# Patient Record
Sex: MALE | Race: WHITE | Employment: FULL TIME | URBAN - METROPOLITAN AREA
[De-identification: names, ages, dates, MRNs, and addresses within clinical notes are randomized per-mention and may not be internally consistent; named-entity substitution may affect disease eponyms.]

---

## 2018-10-26 ENCOUNTER — APPOINTMENT (OUTPATIENT)
Dept: GENERAL RADIOLOGY | Age: 53
End: 2018-10-26
Attending: EMERGENCY MEDICINE
Payer: COMMERCIAL

## 2018-10-26 ENCOUNTER — HOSPITAL ENCOUNTER (OUTPATIENT)
Age: 53
Discharge: HOME | End: 2018-10-26
Attending: EMERGENCY MEDICINE
Payer: COMMERCIAL

## 2018-10-26 VITALS
DIASTOLIC BLOOD PRESSURE: 92 MMHG | SYSTOLIC BLOOD PRESSURE: 157 MMHG | BODY MASS INDEX: 32.51 KG/M2 | TEMPERATURE: 99 F | WEIGHT: 240 LBS | OXYGEN SATURATION: 100 % | HEIGHT: 72 IN | HEART RATE: 62 BPM | RESPIRATION RATE: 20 BRPM

## 2018-10-26 DIAGNOSIS — R05.3 PERSISTENT COUGH FOR 3 WEEKS OR LONGER: Primary | ICD-10-CM

## 2018-10-26 PROCEDURE — 99213 OFFICE O/P EST LOW 20 MIN: CPT

## 2018-10-26 PROCEDURE — 87798 DETECT AGENT NOS DNA AMP: CPT | Performed by: EMERGENCY MEDICINE

## 2018-10-26 PROCEDURE — 71046 X-RAY EXAM CHEST 2 VIEWS: CPT | Performed by: EMERGENCY MEDICINE

## 2018-10-26 PROCEDURE — 99214 OFFICE O/P EST MOD 30 MIN: CPT

## 2018-10-26 RX ORDER — AZITHROMYCIN 250 MG/1
TABLET, FILM COATED ORAL
Qty: 1 PACKAGE | Refills: 0 | Status: SHIPPED | OUTPATIENT
Start: 2018-10-26 | End: 2020-11-17

## 2018-10-26 NOTE — ED INITIAL ASSESSMENT (HPI)
C/o cough for 2 months, hot/cold sweats the past 2 days, no energy, intermittent chest pressure. Denies NVD. Denies SOB.

## 2018-10-26 NOTE — ED PROVIDER NOTES
Patient Seen in: [address]    History   Patient presents with:  Cough/URI    Stated Complaint: Chronic cough     HPI    The patient is a 47-year-old male with no significant past medical history presents now with a pers Soft, nontender and nondistended  Neurologic: Patient is awake, alert and oriented ×3.   The patient's motor strength is 5 out of 5 and symmetric in the upper and lower extremities bilaterally  Extremities: No focal swelling or tenderness  Skin: No pallor,

## 2020-11-17 ENCOUNTER — OFFICE VISIT (OUTPATIENT)
Dept: NEUROLOGY | Facility: CLINIC | Age: 55
End: 2020-11-17
Payer: COMMERCIAL

## 2020-11-17 ENCOUNTER — TELEPHONE (OUTPATIENT)
Dept: PHYSICAL THERAPY | Age: 55
End: 2020-11-17

## 2020-11-17 ENCOUNTER — TELEPHONE (OUTPATIENT)
Dept: PHYSICAL THERAPY | Facility: HOSPITAL | Age: 55
End: 2020-11-17

## 2020-11-17 VITALS — BODY MASS INDEX: 33.86 KG/M2 | WEIGHT: 250 LBS | HEIGHT: 72 IN

## 2020-11-17 DIAGNOSIS — G47.00 INSOMNIA, UNSPECIFIED TYPE: ICD-10-CM

## 2020-11-17 DIAGNOSIS — M51.26 LUMBAR DISC HERNIATION: Primary | ICD-10-CM

## 2020-11-17 DIAGNOSIS — M54.16 LUMBAR RADICULOPATHY: ICD-10-CM

## 2020-11-17 DIAGNOSIS — E66.3 PATIENT OVERWEIGHT: ICD-10-CM

## 2020-11-17 PROBLEM — I10 ESSENTIAL HYPERTENSION: Status: ACTIVE | Noted: 2018-11-06

## 2020-11-17 PROCEDURE — 99072 ADDL SUPL MATRL&STAF TM PHE: CPT | Performed by: PHYSICAL MEDICINE & REHABILITATION

## 2020-11-17 PROCEDURE — 3008F BODY MASS INDEX DOCD: CPT | Performed by: PHYSICAL MEDICINE & REHABILITATION

## 2020-11-17 PROCEDURE — 99244 OFF/OP CNSLTJ NEW/EST MOD 40: CPT | Performed by: PHYSICAL MEDICINE & REHABILITATION

## 2020-11-17 RX ORDER — CYCLOBENZAPRINE HCL 10 MG
10 TABLET ORAL NIGHTLY
Qty: 30 TABLET | Refills: 0 | Status: SHIPPED | OUTPATIENT
Start: 2020-11-17 | End: 2020-11-30

## 2020-11-17 RX ORDER — PREDNISONE 10 MG/1
TABLET ORAL
Qty: 28 TABLET | Refills: 0 | Status: SHIPPED | OUTPATIENT
Start: 2020-11-17 | End: 2020-11-30 | Stop reason: ALTCHOICE

## 2020-11-17 RX ORDER — GABAPENTIN 300 MG/1
300 CAPSULE ORAL NIGHTLY
COMMUNITY
Start: 2020-11-02 | End: 2020-12-15

## 2020-11-17 RX ORDER — MELOXICAM 15 MG/1
15 TABLET ORAL DAILY
Qty: 30 TABLET | Refills: 0 | Status: SHIPPED | OUTPATIENT
Start: 2020-11-17 | End: 2020-12-15

## 2020-11-17 NOTE — PROGRESS NOTES
[address]  Progress Note    CHIEF COMPLAINT:  Patient presents with:  Low Back Pain: New patient presents for low back pain.  Patient states that back pain started about 1 year ago c/o pain radiatin into rig Outpatient Medications   Medication Sig Dispense Refill   • gabapentin 300 MG Oral Cap Take 300 mg by mouth nightly.      • predniSONE 10 MG Oral Tab Take 7 tablets today,take 6 tablets tomorrow, then decrease number of tablets by one tablet each of the rem deformities  Heart: peripheral pulses intact. Normal capillary refill. Lungs: Non-labored respirations  Extremities: No lower extremity edema bilaterally   Skin: No lesions noted.    Spine: limited and painful lumbar extension  Hips: full and painfree ROM

## 2020-11-18 ENCOUNTER — OFFICE VISIT (OUTPATIENT)
Dept: PHYSICAL THERAPY | Age: 55
End: 2020-11-18
Attending: PHYSICAL MEDICINE & REHABILITATION
Payer: COMMERCIAL

## 2020-11-18 DIAGNOSIS — M51.26 LUMBAR DISC HERNIATION: ICD-10-CM

## 2020-11-18 DIAGNOSIS — M54.16 LUMBAR RADICULOPATHY: ICD-10-CM

## 2020-11-18 PROCEDURE — 97110 THERAPEUTIC EXERCISES: CPT

## 2020-11-18 PROCEDURE — 97162 PT EVAL MOD COMPLEX 30 MIN: CPT

## 2020-11-18 PROCEDURE — 97530 THERAPEUTIC ACTIVITIES: CPT

## 2020-11-18 NOTE — PROGRESS NOTES
SPINE EVALUATION:   Referring Physician: Dr. Sarita Cogan  Diagnosis:     Lumbar disc herniation (M51.26)  Lumbar radiculopathy (M54.16) Date of Service: 11/18/2020     PATIENT SUMMARY   Nicky Valiente \"Rob\" is a 60year old male who presents to therapy today w functional limitations include: difficulty with lying down which limits sleeps; bending; twisting; walking; standing on hard surfaces such standing to take a shower.      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 Skilled Physical Therapy is medically necessary to address the above impairments and reach functional goals.      Precautions:  None  OBJECTIVE:   Observation/Posture: Decreased lumbar lordosis    Neuro Screen: B LEs intact to light touch factors/comorbidities, 4+ body structures involved/activity limitations, and unstable symptoms including changing pain levels. PLAN OF CARE:    Goals: (to be met in 10 visits)   1.  Patient will be independent with HEP to optimize gains made in PT to home

## 2020-11-20 ENCOUNTER — OFFICE VISIT (OUTPATIENT)
Dept: PHYSICAL THERAPY | Age: 55
End: 2020-11-20
Attending: PHYSICAL MEDICINE & REHABILITATION
Payer: COMMERCIAL

## 2020-11-20 PROCEDURE — 97110 THERAPEUTIC EXERCISES: CPT

## 2020-11-20 PROCEDURE — 97140 MANUAL THERAPY 1/> REGIONS: CPT

## 2020-11-20 NOTE — PROGRESS NOTES
Dx: Lumbar disc herniation (M51.26)  Lumbar radiculopathy (M54.16)        Insurance (Authorized # of Visits):  BCBS PPO; 10 visits cert ends 8/02/0045           Authorizing Physician: Dr. Serrano Current  Next MD visit: 12/1/2020  Fall Risk: standard         Noman Dai tilts 10x2  Supine: Hip add ball 10x2  Supine: Hip abd tband 10x2       Manual Therapy  Sidelying MFR at lumbar x 10 min  Sidelying: lumbar articulation to improve flex & ext                     HEP: repeated flex in sitting; Supine pelvic tilt; sciatic n.

## 2020-11-20 NOTE — PATIENT INSTRUCTIONS
Home Exercise Program    SCIATIC NERVE GLIDE - SUPINE -  Repeat 20 Times, Hold 1 Second(s), Complete 2 Sets, Perform 2 Times a Day    LOWER TRUNK ROTATIONS - LTR - WIG WAGS -  Repeat 10 Times, Hold 1 Second(s), Complete 1 Set, Perform 2 Times a Day    Hip

## 2020-11-24 ENCOUNTER — OFFICE VISIT (OUTPATIENT)
Dept: PHYSICAL THERAPY | Age: 55
End: 2020-11-24
Attending: PHYSICAL MEDICINE & REHABILITATION
Payer: COMMERCIAL

## 2020-11-24 PROCEDURE — 97140 MANUAL THERAPY 1/> REGIONS: CPT

## 2020-11-24 PROCEDURE — 97110 THERAPEUTIC EXERCISES: CPT

## 2020-11-24 NOTE — PROGRESS NOTES
Dx: Lumbar disc herniation (M51.26)  Lumbar radiculopathy (M54.16)        Insurance (Authorized # of Visits):  BCBS PPO; 10 visits cert ends 2/52/6244           Authorizing Physician: Dr. Sameera Corral  Next MD visit: 12/1/2020  Fall Risk: standard         Meera Bruner will discuss additional options for pain management such as an injection if appropriate.  He will continue to benefit from PT to work on ROM, strength and functional mobility training to meet his goals to meet occupational demands and return to usual ADLs a 11/24/2020              TX#: 3/10 Date:                 TX#: 4/ Date:                 TX#: 5/ Date:    Tx#: 6/   Therapeutic Exercises  Supine: Sciatic n. neuromob 20 ankle pumps x 2  Supine LTR 10x2  Supine Pelvic tilts 10x2  Supine: Hip add ball 10x2  Sup

## 2020-11-30 ENCOUNTER — PATIENT MESSAGE (OUTPATIENT)
Dept: NEUROLOGY | Facility: CLINIC | Age: 55
End: 2020-11-30

## 2020-11-30 ENCOUNTER — MED REC SCAN ONLY (OUTPATIENT)
Dept: NEUROLOGY | Facility: CLINIC | Age: 55
End: 2020-11-30

## 2020-11-30 ENCOUNTER — OFFICE VISIT (OUTPATIENT)
Dept: NEUROLOGY | Facility: CLINIC | Age: 55
End: 2020-11-30
Payer: COMMERCIAL

## 2020-11-30 ENCOUNTER — TELEPHONE (OUTPATIENT)
Dept: NEUROLOGY | Facility: CLINIC | Age: 55
End: 2020-11-30

## 2020-11-30 VITALS — WEIGHT: 250 LBS | HEIGHT: 72 IN | BODY MASS INDEX: 33.86 KG/M2

## 2020-11-30 DIAGNOSIS — M54.16 LUMBAR RADICULOPATHY: Primary | ICD-10-CM

## 2020-11-30 PROBLEM — R73.9 HYPERGLYCEMIA: Status: ACTIVE | Noted: 2017-12-19

## 2020-11-30 PROBLEM — G47.33 OBSTRUCTIVE SLEEP APNEA SYNDROME: Status: ACTIVE | Noted: 2017-12-19

## 2020-11-30 PROCEDURE — 3008F BODY MASS INDEX DOCD: CPT | Performed by: PHYSICAL MEDICINE & REHABILITATION

## 2020-11-30 PROCEDURE — 99214 OFFICE O/P EST MOD 30 MIN: CPT | Performed by: PHYSICAL MEDICINE & REHABILITATION

## 2020-11-30 RX ORDER — HYDROCODONE BITARTRATE AND ACETAMINOPHEN 7.5; 325 MG/1; MG/1
1 TABLET ORAL EVERY 6 HOURS PRN
Qty: 56 TABLET | Refills: 0 | Status: SHIPPED | OUTPATIENT
Start: 2020-11-30 | End: 2020-12-14

## 2020-11-30 RX ORDER — CYCLOBENZAPRINE HCL 10 MG
20 TABLET ORAL NIGHTLY
Qty: 60 TABLET | Refills: 0 | Status: SHIPPED | OUTPATIENT
Start: 2020-11-30 | End: 2020-12-15

## 2020-11-30 NOTE — TELEPHONE ENCOUNTER
Patient has been scheduled for a Left L5 (L5-S1) TFESI on 12/10/20 at the [address] [place] [address]. Medications and allergies reviewed.  Patient informed we will need verbal or written authorization from patients Primary Care Physician/Cardiologist to hold blood thinners 3-5 d

## 2020-11-30 NOTE — PROGRESS NOTES
[address]  Progress Note    CHIEF COMPLAINT:  Patient presents with:  Low Back Pain: Patient presents to follow up on low back pain. He is currently in physical therapy which is helping a little.  Taking fle Medications   Medication Sig Dispense Refill   • HYDROcodone-acetaminophen 7.5-325 MG Oral Tab Take 1 tablet by mouth every 6 (six) hours as needed for Pain. 56 tablet 0   • cyclobenzaprine 10 MG Oral Tab Take 2 tablets (20 mg total) by mouth nightly.  60 t body of L5. Other levels are unremarkable in comparison. 2.  I reviewed an MRI lumbar spine from October 2019 showing a similar disc herniation with a smaller extruded fragment. ASSESSMENT AND PLAN:  1.  Lumbar radiculopathy  He still has severe rad

## 2020-11-30 NOTE — TELEPHONE ENCOUNTER
Left L5 (L5-S1) TFESI CPT CODE: 49291,29019 -APPROVED  Per [employer] online no authorization is required.    Transaction ID: [id number] Transaction Date: Nov 30 12:20 pm Customer ID: [id number]  Will inform BAILEY Approved referrals

## 2020-11-30 NOTE — TELEPHONE ENCOUNTER
From: Cait Soria  To: Lang Barron MD  Sent: 11/30/2020 2:38 PM CST  Subject: Visit Follow-up Question    Dr Mary Sloan,  I got called from scheduling and they said nothing until 12/10. Not sure if I can wait that long with the pain.  Are there other opti

## 2020-11-30 NOTE — TELEPHONE ENCOUNTER
How about Wednesday or Friday AM with Janay in radiology at the [facility]? We can sedate him with Valium and benadryl again.

## 2020-12-01 NOTE — TELEPHONE ENCOUNTER
Confirmed with patient that he will be available and have a  to appt on Friday at 0730am.    Left message with Maty sAhby in central scheduling. Waiting on her return call to confirm.

## 2020-12-02 ENCOUNTER — OFFICE VISIT (OUTPATIENT)
Dept: PHYSICAL THERAPY | Age: 55
End: 2020-12-02
Attending: PHYSICAL MEDICINE & REHABILITATION
Payer: COMMERCIAL

## 2020-12-02 PROCEDURE — 97110 THERAPEUTIC EXERCISES: CPT

## 2020-12-02 RX ORDER — DIAZEPAM 10 MG/1
TABLET ORAL
Qty: 2 TABLET | Refills: 0 | Status: SHIPPED | OUTPATIENT
Start: 2020-12-02 | End: 2020-12-15 | Stop reason: ALTCHOICE

## 2020-12-02 NOTE — PATIENT INSTRUCTIONS
Home Exercise Program    HIP EXTENSION - STANDING -  Repeat 10 Times, Hold 1 Second(s), Complete 2 Sets, Perform 2 Times a Day    HIP ABDUCTION - SIDELYING -  Repeat 10 Times, Hold 1 Second(s), Complete 2 Sets, Perform 2 Times a Day    SIDELYING CLAMSHELL

## 2020-12-02 NOTE — PROGRESS NOTES
Dx: Lumbar disc herniation (M51.26)  Lumbar radiculopathy (M54.16)        Insurance (Authorized # of Visits):  BCBS PPO; 10 visits cert ends 2/38/3006           Authorizing Physician: Dr. Pollo Klein MD visit: 12/1/2020  Fall Risk: standard         Mariela Acevedo to call Dr. Sameera Corral if edema persists. Goals: (to be met in 10 visits)   1. Patient will be independent with HEP to optimize gains made in PT to home and community settings and for self management of prophylactic care.   2. Patient will have trunk ROM [facility] pelvic tilt; sciatic n.  Neuromob; LTR; hip add ball; hip abd tband; sidelying clams; slidelying hip abd; standing hip ext    Charges: 3 TE      Total Timed Treatment: 50 min  Total Treatment Time: 50 min

## 2020-12-02 NOTE — TELEPHONE ENCOUNTER
LMTCB - to confirm injection appt with patient. 12/04/20 [facility] arrival time 0715am. Remind patient to do online check in through [address]

## 2020-12-03 RX ORDER — BETAMETHASONE SODIUM PHOSPHATE AND BETAMETHASONE ACETATE 3; 3 MG/ML; MG/ML
12 INJECTION, SUSPENSION INTRA-ARTICULAR; INTRALESIONAL; INTRAMUSCULAR; SOFT TISSUE ONCE
Status: COMPLETED | OUTPATIENT
Start: 2020-12-04 | End: 2020-12-04

## 2020-12-04 ENCOUNTER — HOSPITAL ENCOUNTER (OUTPATIENT)
Dept: GENERAL RADIOLOGY | Facility: HOSPITAL | Age: 55
Discharge: HOME | End: 2020-12-04
Attending: PHYSICAL MEDICINE & REHABILITATION
Payer: COMMERCIAL

## 2020-12-04 ENCOUNTER — OFFICE VISIT (OUTPATIENT)
Dept: NEUROLOGY | Facility: CLINIC | Age: 55
End: 2020-12-04
Payer: COMMERCIAL

## 2020-12-04 DIAGNOSIS — M54.16 LUMBAR RADICULOPATHY: Primary | ICD-10-CM

## 2020-12-04 DIAGNOSIS — M54.16 LUMBAR RADICULOPATHY: ICD-10-CM

## 2020-12-04 PROCEDURE — 77002 NEEDLE LOCALIZATION BY XRAY: CPT | Performed by: PHYSICAL MEDICINE & REHABILITATION

## 2020-12-04 PROCEDURE — 64483 NJX AA&/STRD TFRM EPI L/S 1: CPT | Performed by: PHYSICAL MEDICINE & REHABILITATION

## 2020-12-04 PROCEDURE — 64483 NJX AA&/STRD TFRM EPI L/S 1: CPT

## 2020-12-04 PROCEDURE — 77002 NEEDLE LOCALIZATION BY XRAY: CPT

## 2020-12-04 RX ORDER — LIDOCAINE HYDROCHLORIDE 10 MG/ML
15 INJECTION, SOLUTION EPIDURAL; INFILTRATION; INTRACAUDAL; PERINEURAL ONCE
Status: DISCONTINUED | OUTPATIENT
Start: 2020-12-04 | End: 2020-12-06

## 2020-12-04 RX ORDER — DIPHENHYDRAMINE HYDROCHLORIDE 50 MG/ML
INJECTION INTRAMUSCULAR; INTRAVENOUS
Status: COMPLETED
Start: 2020-12-04 | End: 2020-12-04

## 2020-12-04 RX ORDER — LIDOCAINE HYDROCHLORIDE 10 MG/ML
INJECTION, SOLUTION EPIDURAL; INFILTRATION; INTRACAUDAL; PERINEURAL
Status: COMPLETED
Start: 2020-12-04 | End: 2020-12-04

## 2020-12-04 RX ORDER — DIPHENHYDRAMINE HYDROCHLORIDE 50 MG/ML
50 INJECTION INTRAMUSCULAR; INTRAVENOUS EVERY 4 HOURS PRN
Status: DISCONTINUED | OUTPATIENT
Start: 2020-12-04 | End: 2020-12-06

## 2020-12-04 RX ORDER — LIDOCAINE HYDROCHLORIDE 10 MG/ML
1 INJECTION, SOLUTION EPIDURAL; INFILTRATION; INTRACAUDAL; PERINEURAL ONCE
Status: COMPLETED | OUTPATIENT
Start: 2020-12-04 | End: 2020-12-04

## 2020-12-04 RX ORDER — DIPHENHYDRAMINE HYDROCHLORIDE 50 MG/ML
25 INJECTION INTRAMUSCULAR; INTRAVENOUS ONCE
Status: COMPLETED | OUTPATIENT
Start: 2020-12-04 | End: 2020-12-04

## 2020-12-04 RX ADMIN — BETAMETHASONE SODIUM PHOSPHATE AND BETAMETHASONE ACETATE 12 MG: 3; 3 INJECTION, SUSPENSION INTRA-ARTICULAR; INTRALESIONAL; INTRAMUSCULAR; SOFT TISSUE at 08:00:00

## 2020-12-04 RX ADMIN — DIPHENHYDRAMINE HYDROCHLORIDE 50 MG: 50 INJECTION INTRAMUSCULAR; INTRAVENOUS at 08:00:00

## 2020-12-04 RX ADMIN — LIDOCAINE HYDROCHLORIDE 15 ML: 10 INJECTION, SOLUTION EPIDURAL; INFILTRATION; INTRACAUDAL; PERINEURAL at 08:00:00

## 2020-12-04 NOTE — PROCEDURES
Preoperative Diagnosis:  (M54.16) Lumbar radiculopathy  (primary encounter diagnosis)       Postoperative Diagnosis:  (M54.16) Lumbar radiculopathy  (primary encounter diagnosis)       Procedures: Left L5 Transforaminal epidural steroid injection under flu

## 2020-12-04 NOTE — IMAGING NOTE
Received patient in rad holding post epidural injection done by Dr Soto Torres @ 0830. Review discharge criteria with Dr Soto Torres. Mediations charted. Wife with patient during recovery. No acute discomfort noted. 0830 VS /65 HR 62 PO2 sat 96%.     Juice pr

## 2020-12-07 ENCOUNTER — OFFICE VISIT (OUTPATIENT)
Dept: PHYSICAL THERAPY | Age: 55
End: 2020-12-07
Attending: PHYSICAL MEDICINE & REHABILITATION
Payer: COMMERCIAL

## 2020-12-07 PROCEDURE — 97110 THERAPEUTIC EXERCISES: CPT

## 2020-12-07 PROCEDURE — 97140 MANUAL THERAPY 1/> REGIONS: CPT

## 2020-12-07 PROCEDURE — 97112 NEUROMUSCULAR REEDUCATION: CPT

## 2020-12-07 NOTE — PROGRESS NOTES
Dx: Lumbar disc herniation (M51.26)  Lumbar radiculopathy (M54.16)        Insurance (Authorized # of Visits):  BCBS PPO; 10 visits cert ends 9/79/6633           Authorizing Physician: Dr. Edwina Klein MD visit: 12/1/2020  Fall Risk: standard         Aguila Holder painfree. Pt advised to contact Dr. Soto Torres on Thur/Fri for f/u following injection. Goals: (to be met in 10 visits)   1.  Patient will be independent with HEP to optimize gains made in PT to home and community settings and for self management of prophy manip  MET for ant rot at R innominate with \"shotgun\"   Prone: MFR at L lumbar/L hamstrings Manual Therapy  PROM at B hips x 5 min Manual Therapy  Sidelying MFR at B lumbar paraspinals/L hamstrings x 8 min  Sidelying: MFR L sciatic nerve x 3 min      Nikita

## 2020-12-09 ENCOUNTER — OFFICE VISIT (OUTPATIENT)
Dept: PHYSICAL THERAPY | Age: 55
End: 2020-12-09
Attending: PHYSICAL MEDICINE & REHABILITATION
Payer: COMMERCIAL

## 2020-12-09 ENCOUNTER — MED REC SCAN ONLY (OUTPATIENT)
Dept: NEUROLOGY | Facility: CLINIC | Age: 55
End: 2020-12-09

## 2020-12-09 ENCOUNTER — PATIENT MESSAGE (OUTPATIENT)
Dept: NEUROLOGY | Facility: CLINIC | Age: 55
End: 2020-12-09

## 2020-12-09 DIAGNOSIS — M51.26 LUMBAR DISC HERNIATION: Primary | ICD-10-CM

## 2020-12-09 PROCEDURE — 97112 NEUROMUSCULAR REEDUCATION: CPT

## 2020-12-09 PROCEDURE — 97110 THERAPEUTIC EXERCISES: CPT

## 2020-12-09 PROCEDURE — 97140 MANUAL THERAPY 1/> REGIONS: CPT

## 2020-12-09 NOTE — TELEPHONE ENCOUNTER
From: Evangelist Jimenes  To: Julian Pabon MD  Sent: 12/9/2020 2:39 PM CST  Subject: Non-Urgent Medical Question    [facility],  I need it to be understood that if surgery is an option , that I want it before year end. Secondly, I need a note from the doctor.

## 2020-12-09 NOTE — PROGRESS NOTES
Dx: Lumbar disc herniation (M51.26)  Lumbar radiculopathy (M54.16)        Insurance (Authorized # of Visits):  BCBS PPO; 10 visits cert ends 6/33/6827           Authorizing Physician: Dr. Malachi Klein MD visit: 12/1/2020  Fall Risk: standard         Rafa Garcia 4/5            Hip ER: R 4/5; L 5/5  Hip IR: R 5/5; L 5/5  Knee Flexion: R 5/5; L 5/5            Knee extension (L3): R 4 -/5*; L 5/5            DF (L4): R 5/5; L 5/5  Great Toe Ext (L5): R 5/5, L 5/5  PF (S1): R 4/5; L 5/5 Hip flexion (L2): R 4/5; L 5/5 able to sleep for at least 6 consecutive hours without disturbance due to back/LE pain. 5. Patient will be able to perform ADLs such as standing to shower without provocation of back pain. 12/9/2020: Reviewed goals with pt. Goals are in progress. 20x  Supine Pelvic tilt x5 (stopped due to pain)  Sidelying: glut sets 20x  Sidelying: hip clams 10x2  Sidelying: hip SLR 10x2  Sitting piriformis stretch 20\"x3  Standing hip ext 10x2   Therapeutic Exercise  Sidelying: caridad 10x2  Sidelying: Hip add

## 2020-12-09 NOTE — TELEPHONE ENCOUNTER
It can take 7 days to have the max effect from the injection. Continue with Lesa. Needs a 2 week follow up appt with me around 12/18/20 please. Will need to allow injection to fully take effect before reassessing. Continue all other meds.

## 2020-12-09 NOTE — TELEPHONE ENCOUNTER
From: Brianna Metzger  To: Preston Richards MD  Sent: 12/9/2020 10:35 AM CST  Subject: Visit Follow-up Question    Dr Jennifer Hummel,  I just got back from PT with Maria Luisa Sanders. She said that she would be reaching out to you today.   My situation seems like I am back to squ

## 2020-12-10 NOTE — TELEPHONE ENCOUNTER
Ok, pls send to Dr. Lolita Mercado. Have him drop off any forms for us to sign for FMLA. Will contact Sheryl. Guadalupe Riedel

## 2020-12-11 ENCOUNTER — TELEPHONE (OUTPATIENT)
Dept: NEUROLOGY | Facility: CLINIC | Age: 55
End: 2020-12-11

## 2020-12-15 ENCOUNTER — HOSPITAL ENCOUNTER (OUTPATIENT)
Dept: GENERAL RADIOLOGY | Facility: HOSPITAL | Age: 55
Discharge: HOME | End: 2020-12-15
Attending: NEUROLOGICAL SURGERY
Payer: COMMERCIAL

## 2020-12-15 ENCOUNTER — OFFICE VISIT (OUTPATIENT)
Dept: PHYSICAL THERAPY | Age: 55
End: 2020-12-15
Attending: PHYSICAL MEDICINE & REHABILITATION
Payer: COMMERCIAL

## 2020-12-15 ENCOUNTER — MED REC SCAN ONLY (OUTPATIENT)
Dept: NEUROLOGY | Facility: CLINIC | Age: 55
End: 2020-12-15

## 2020-12-15 ENCOUNTER — OFFICE VISIT (OUTPATIENT)
Dept: NEUROLOGY | Facility: CLINIC | Age: 55
End: 2020-12-15
Payer: COMMERCIAL

## 2020-12-15 VITALS — BODY MASS INDEX: 33.86 KG/M2 | HEIGHT: 72 IN | WEIGHT: 250 LBS

## 2020-12-15 DIAGNOSIS — M48.062 SPINAL STENOSIS, LUMBAR REGION WITH NEUROGENIC CLAUDICATION: ICD-10-CM

## 2020-12-15 DIAGNOSIS — Z01.811 ENCOUNTER FOR PREOPERATIVE PULMONARY EXAMINATION: ICD-10-CM

## 2020-12-15 DIAGNOSIS — M51.26 DISPLACEMENT OF LUMBAR INTERVERTEBRAL DISC WITHOUT MYELOPATHY: ICD-10-CM

## 2020-12-15 DIAGNOSIS — M51.26 LUMBAR DISC HERNIATION: Primary | ICD-10-CM

## 2020-12-15 DIAGNOSIS — M47.26 OTHER SPONDYLOSIS WITH RADICULOPATHY, LUMBAR REGION: ICD-10-CM

## 2020-12-15 DIAGNOSIS — M54.16 LUMBAR RADICULOPATHY: ICD-10-CM

## 2020-12-15 PROCEDURE — 71046 X-RAY EXAM CHEST 2 VIEWS: CPT | Performed by: NEUROLOGICAL SURGERY

## 2020-12-15 PROCEDURE — 3008F BODY MASS INDEX DOCD: CPT | Performed by: PHYSICAL MEDICINE & REHABILITATION

## 2020-12-15 PROCEDURE — 99213 OFFICE O/P EST LOW 20 MIN: CPT | Performed by: PHYSICAL MEDICINE & REHABILITATION

## 2020-12-15 PROCEDURE — 97110 THERAPEUTIC EXERCISES: CPT

## 2020-12-15 PROCEDURE — 97112 NEUROMUSCULAR REEDUCATION: CPT

## 2020-12-15 PROCEDURE — 72040 X-RAY EXAM NECK SPINE 2-3 VW: CPT | Performed by: NEUROLOGICAL SURGERY

## 2020-12-15 RX ORDER — HYDROCODONE BITARTRATE AND ACETAMINOPHEN 7.5; 325 MG/1; MG/1
1 TABLET ORAL EVERY 6 HOURS PRN
Qty: 60 TABLET | Refills: 0 | Status: SHIPPED | OUTPATIENT
Start: 2020-12-15

## 2020-12-15 RX ORDER — HYDROCODONE BITARTRATE AND ACETAMINOPHEN 7.5; 325 MG/1; MG/1
TABLET ORAL
COMMUNITY
Start: 2020-11-30 | End: 2020-12-15

## 2020-12-15 RX ORDER — CYCLOBENZAPRINE HCL 10 MG
20 TABLET ORAL NIGHTLY
Qty: 60 TABLET | Refills: 0 | Status: SHIPPED | OUTPATIENT
Start: 2020-12-15 | End: 2021-01-14

## 2020-12-15 NOTE — PROGRESS NOTES
Dx: Lumbar disc herniation (M51.26)  Lumbar radiculopathy (M54.16)        Insurance (Authorized # of Visits):  BCBS PPO; 10 visits cert ends 4/16/1804           Authorizing Physician: Dr. Luiza Klein MD visit: 12/1/2020  Fall Risk: standard         Recardo Bouquet 5/5  Hip IR: R 5/5; L 5/5  Knee Flexion: R 5/5; L 5/5            Knee extension (L3): R 5/5; L 5/5 (leg shaking)            DF (L4): R 5/5; L 5/5 (leg shaking)  Great Toe Ext (L5): R 5/5, L 5/5  PF (S1): R 5/5; L 5/5 (leg shaking)  * able to perform single 10x2  Supine: Hip abd tband 10x2 Therapeutic Exercises  Supine Hip add ball 20x  Supine Hip abd tband 20x  Supine Pelvic tilt x5 (stopped due to pain)  Sidelying: glut sets 20x  Sidelying: hip clams 10x2  Sidelying: hip SLR 10x2  Sitting piriformis stretch trial with SC for unloading at L LE  Weight shifting M/L in standing: 10x1 Neuro Re-ed  Supine: bridging spinal articulation 10x2  Gait training: emphasis on gluteal activation x 3 min  Weight shifting M/L in standing: 10x1           HEP: repeated flex in

## 2020-12-15 NOTE — PROGRESS NOTES
[address]  Progress Note    CHIEF COMPLAINT:  Patient presents with:  Low Back Pain: Patient presents for post Left L5 transforaminal Epidural injection .  Patient states that his symptoms remain the same an tablets (20 mg total) by mouth nightly.  60 tablet 0       ALLERGIES:     Levaquin [Levofloxa*      Penicillins               Quinolones                  REVIEW OF SYSTEMS:   Patient-reported ROS  Constitutional  Sleep Disturbance: admits   Cardiovascular intervention. He will be seeing Dr. Mushtaq Troncoso in the afternoon. I believe that is a good move. I gave him some Norco and Flexeril to hold him through the perioperative period. He will return as needed.     2. Lumbar radiculopathy  The epidural injection help

## 2020-12-17 ENCOUNTER — HOSPITAL ENCOUNTER (OUTPATIENT)
Dept: GENERAL RADIOLOGY | Facility: HOSPITAL | Age: 55
Discharge: HOME | End: 2020-12-17
Attending: NEUROLOGICAL SURGERY
Payer: COMMERCIAL

## 2020-12-17 ENCOUNTER — TELEPHONE (OUTPATIENT)
Dept: PHYSICAL THERAPY | Facility: HOSPITAL | Age: 55
End: 2020-12-17

## 2020-12-17 DIAGNOSIS — M51.26 OTHER INTERVERTEBRAL DISC DISPLACEMENT, LUMBAR REGION: ICD-10-CM

## 2020-12-17 DIAGNOSIS — M48.062 SPINAL STENOSIS, LUMBAR REGION WITH NEUROGENIC CLAUDICATION: ICD-10-CM

## 2020-12-17 DIAGNOSIS — M47.26 OTHER SPONDYLOSIS WITH RADICULOPATHY, LUMBAR REGION: ICD-10-CM

## 2020-12-17 PROCEDURE — 72110 X-RAY EXAM L-2 SPINE 4/>VWS: CPT | Performed by: NEUROLOGICAL SURGERY

## 2020-12-18 ENCOUNTER — HOSPITAL ENCOUNTER (OUTPATIENT)
Dept: GENERAL RADIOLOGY | Facility: HOSPITAL | Age: 55
Discharge: HOME | End: 2020-12-18
Attending: INTERNAL MEDICINE
Payer: COMMERCIAL

## 2020-12-18 ENCOUNTER — APPOINTMENT (OUTPATIENT)
Dept: PHYSICAL THERAPY | Age: 55
End: 2020-12-18
Attending: PHYSICAL MEDICINE & REHABILITATION
Payer: COMMERCIAL

## 2020-12-18 DIAGNOSIS — Z01.818 PRE-OP EVALUATION: ICD-10-CM

## 2020-12-18 PROCEDURE — 71046 X-RAY EXAM CHEST 2 VIEWS: CPT | Performed by: INTERNAL MEDICINE

## 2020-12-21 ENCOUNTER — APPOINTMENT (OUTPATIENT)
Dept: PHYSICAL THERAPY | Age: 55
End: 2020-12-21
Attending: PHYSICAL MEDICINE & REHABILITATION
Payer: COMMERCIAL

## 2020-12-23 ENCOUNTER — APPOINTMENT (OUTPATIENT)
Dept: PHYSICAL THERAPY | Age: 55
End: 2020-12-23
Attending: PHYSICAL MEDICINE & REHABILITATION
Payer: COMMERCIAL

## 2020-12-28 ENCOUNTER — LAB REQUISITION (OUTPATIENT)
Dept: LAB | Facility: HOSPITAL | Age: 55
End: 2020-12-28
Payer: COMMERCIAL

## 2020-12-28 DIAGNOSIS — Z01.818 ENCOUNTER FOR OTHER PREPROCEDURAL EXAMINATION: ICD-10-CM

## 2020-12-28 LAB — SARS-COV-2 RNA RESP QL NAA+PROBE: NOT DETECTED

## 2021-01-07 ENCOUNTER — TELEPHONE (OUTPATIENT)
Dept: NEUROLOGY | Facility: CLINIC | Age: 56
End: 2021-01-07

## 2021-01-07 NOTE — TELEPHONE ENCOUNTER
Called patient, who states pt received an email from Macie Louis requesting  additional information for claim.  Provided patient with billing's phone number ([phone])

## 2021-01-07 NOTE — TELEPHONE ENCOUNTER
Pt called to check on status of approval for LUMBAR TRANSFORAMINAL EPIDURAL INJECTION. He was told by the [employer] company that they requested more information from is and we have not responded. Please contact patient with update.

## 2021-01-19 ENCOUNTER — ORDER TRANSCRIPTION (OUTPATIENT)
Dept: PHYSICAL THERAPY | Facility: HOSPITAL | Age: 56
End: 2021-01-19

## 2021-01-19 DIAGNOSIS — M47.26 OTHER SPONDYLOSIS WITH RADICULOPATHY, LUMBAR REGION: Primary | ICD-10-CM

## 2021-01-25 ENCOUNTER — OFFICE VISIT (OUTPATIENT)
Dept: PHYSICAL THERAPY | Age: 56
End: 2021-01-25
Attending: NEUROLOGICAL SURGERY
Payer: COMMERCIAL

## 2021-01-25 DIAGNOSIS — M47.26 OTHER SPONDYLOSIS WITH RADICULOPATHY, LUMBAR REGION: ICD-10-CM

## 2021-01-25 PROCEDURE — 97161 PT EVAL LOW COMPLEX 20 MIN: CPT | Performed by: PHYSICAL THERAPIST

## 2021-01-25 PROCEDURE — 97530 THERAPEUTIC ACTIVITIES: CPT | Performed by: PHYSICAL THERAPIST

## 2021-01-25 PROCEDURE — 97110 THERAPEUTIC EXERCISES: CPT | Performed by: PHYSICAL THERAPIST

## 2021-01-25 NOTE — PROGRESS NOTES
SPINE EVALUATION:   Referring Physician: Dr. Devin Araya  Diagnosis:  Other spondylosis with radiculopathy, lumbar region (M47.26), s/p surgery 12/31/2020 Date of Service: 1/25/2021     PATIENT SUMMARY   Joaquina Vera is a 60year old male who presents to t dysphasia, dizziness, drop attacks, bowel/bladder changes, saddle anesthesia, and APARNA LE N/T.    ASSESSMENT  Isidro Fatima presents to physical therapy evaluation with primary c/o LBP, L LE > R LE radiculopathy s/p spine surgery 12/31/2020.  The results of the ob 4/5  Hip abduction: R 4/5; L 4/5  Hip Extension: R 4/5; L 4/5   Hip ER: R 4/5; L 4/5  Knee Flexion: R 5-/5; L 5-/5   Knee extension (L3): R 5-/5; L 5-/5   DF (L4): R 5/5; L 5/5  Great Toe Ext (L5): R 5-/5, L 5-/5  PF (S1): R/L each at least 2/5     Flexibi Ultrasound and MHP, cold pack    Education or treatment limitation: None  Rehab Potential:good    FOTO: -/100    Patient/Family/Caregiver was advised of these findings, precautions, and treatment options and has agreed to actively participate in planning a

## 2021-01-29 ENCOUNTER — OFFICE VISIT (OUTPATIENT)
Dept: PHYSICAL THERAPY | Age: 56
End: 2021-01-29
Attending: NEUROLOGICAL SURGERY
Payer: COMMERCIAL

## 2021-01-29 DIAGNOSIS — M47.26 OTHER SPONDYLOSIS WITH RADICULOPATHY, LUMBAR REGION: ICD-10-CM

## 2021-01-29 PROCEDURE — 97110 THERAPEUTIC EXERCISES: CPT | Performed by: PHYSICAL THERAPIST

## 2021-01-29 NOTE — PROGRESS NOTES
Dx:  Other spondylosis with radiculopathy, lumbar region (M47.26), s/p surgery 12/31/2020       Insurance (Authorized # of Visits):  PPO Plan         Authorizing Physician: Dr. Terri Watkins  Next MD visit: none scheduled  Fall Risk: standard         Precautions: n march x 10 reps   Supine L/R sciatic nerve glide/gentle hamstrings stretches 5 x 5 sec each  SKC 3 x 5 sec each L/R  S/L  L/R hip ER x 15 reps          Manual:   R lumbar surgical scar massage                     HEP: SKC or seated, across body piriformis

## 2021-02-01 ENCOUNTER — OFFICE VISIT (OUTPATIENT)
Dept: PHYSICAL THERAPY | Age: 56
End: 2021-02-01
Attending: NEUROLOGICAL SURGERY
Payer: COMMERCIAL

## 2021-02-01 PROCEDURE — 97110 THERAPEUTIC EXERCISES: CPT | Performed by: PHYSICAL THERAPIST

## 2021-02-01 NOTE — PROGRESS NOTES
Dx:  Other spondylosis with radiculopathy, lumbar region (M47.26), s/p surgery 12/31/2020       Insurance (Authorized # of Visits):  PPO Plan         Authorizing Physician: Dr. Arsalan De Santiago Next MD visit: none scheduled  Fall Risk: standard         Precautions: n/ ball for adductor isometric  2x 10 reps   Supine TA abdominal stabilization x 3 reps, with alternate march x 10 reps   Supine L/R sciatic nerve glide/gentle hamstrings stretches 5 x 5 sec each  SKC 3 x 5 sec each L/R  S/L  L/R hip ER x 15 reps    There Ex:

## 2021-02-03 ENCOUNTER — OFFICE VISIT (OUTPATIENT)
Dept: PHYSICAL THERAPY | Age: 56
End: 2021-02-03
Attending: NEUROLOGICAL SURGERY
Payer: COMMERCIAL

## 2021-02-03 PROCEDURE — 97140 MANUAL THERAPY 1/> REGIONS: CPT | Performed by: PHYSICAL THERAPIST

## 2021-02-03 PROCEDURE — 97110 THERAPEUTIC EXERCISES: CPT | Performed by: PHYSICAL THERAPIST

## 2021-02-03 NOTE — PROGRESS NOTES
Dx:  Other spondylosis with radiculopathy, lumbar region (M47.26), s/p surgery 12/31/2020       Insurance (Authorized # of Visits):  PPO Plan         Authorizing Physician: Dr. Rickey Klein MD visit: none scheduled  Fall Risk: standard         Precautions: n/ L/R  S/L  L/R hip ER x 15 reps    There Ex:   LE stationary bike: level 1 x 4 min  S/L L/R hip ER x ~20 reps each  SKC 3 x 5 sec each  Supine L/R sciatic nerve glides/hamstrings stretches 5 x 5 sec each  Supine TA abdominal stabilization x 3 reps, with alt sitting piriformis stretch, bridge with ball for adductor isometric, standing hip flexor/gastroc stretch. Pt reports completing squats. May add TA lumbar stabilization with simultaneous alternate LE march.     Charges: 2  there ex,1 manual      Total Time

## 2021-02-08 ENCOUNTER — OFFICE VISIT (OUTPATIENT)
Dept: PHYSICAL THERAPY | Age: 56
End: 2021-02-08
Attending: NEUROLOGICAL SURGERY
Payer: COMMERCIAL

## 2021-02-08 PROCEDURE — 97110 THERAPEUTIC EXERCISES: CPT | Performed by: PHYSICAL THERAPIST

## 2021-02-08 PROCEDURE — 97140 MANUAL THERAPY 1/> REGIONS: CPT | Performed by: PHYSICAL THERAPIST

## 2021-02-08 NOTE — PROGRESS NOTES
ProgressSummary  Pt has attended 5 visits in Physical Therapy.    Dx:  Other spondylosis with radiculopathy, lumbar region (M47.26), s/p surgery 12/31/2020       Insurance (Authorized # of Visits):  [id number] Physician: Dr. Lolita Klein MD touch since initiating PT.  LE flexibility deficits, mild surgical scar adherence, and c/o intermittent LBP/LE symptoms persist. Patient's surgical scar appears WNL; advised him to discuss c/o warmness/itchiness at surgical site during f/u with surgeon this each  Supine TA abdominal stabilization x 3 reps, with alternate march x 10 reps - PT verbal/tactile cuing  Bridge with ball for adductor isometric x 10 reps - declined more reps, reported fatigued  Standing L/R hip flexor/gastroc stretch using staircase 2 scar massage/mobilization Manual:   Lumbar surgical scar massage/mobilization  STM L/R multifidi, lumbar paraspinals Manual:   Lumbar surgical scar massage/mobilization  STM L multifidi, lumbar paraspinals                  HEP: SKC, long sitting piriformis

## 2021-02-10 ENCOUNTER — OFFICE VISIT (OUTPATIENT)
Dept: PHYSICAL THERAPY | Age: 56
End: 2021-02-10
Attending: NEUROLOGICAL SURGERY
Payer: COMMERCIAL

## 2021-02-10 PROCEDURE — 97140 MANUAL THERAPY 1/> REGIONS: CPT | Performed by: PHYSICAL THERAPIST

## 2021-02-10 PROCEDURE — 97110 THERAPEUTIC EXERCISES: CPT | Performed by: PHYSICAL THERAPIST

## 2021-02-10 NOTE — PROGRESS NOTES
Dx:  Other spondylosis with radiculopathy, lumbar region (M47.26), s/p surgery 12/31/2020       Insurance (Authorized # of Visits):  PPO Plan         Authorizing Physician: Dr. Darren Ernandez Next MD visit: 2/11/2021  Fall Risk: standard         Precautions: n/a piriformis L/R x 20 sec each - gentle rotationn  Bridge with ball for adductor isometric  2x 10 reps   Supine TA abdominal stabilization x 3 reps, with alternate march x 10 reps   Supine L/R sciatic nerve glide/gentle hamstrings stretches 5 x 5 sec each  S support  B gastroc stretch on slant board 2 x 20 sec each     There Ex:   Supine L/R hip flexor/quad stretch 2 x 30 sec each  Supine TA lumbar stabilization x 5 reps, with alternate hip flexion x 10 each L/R  Supine L/R gentle sciatic nerve glides 5 x 5 se

## 2021-02-15 ENCOUNTER — TELEPHONE (OUTPATIENT)
Dept: PHYSICAL THERAPY | Facility: HOSPITAL | Age: 56
End: 2021-02-15

## 2021-02-15 ENCOUNTER — APPOINTMENT (OUTPATIENT)
Dept: PHYSICAL THERAPY | Age: 56
End: 2021-02-15
Attending: NEUROLOGICAL SURGERY
Payer: COMMERCIAL

## 2021-02-17 ENCOUNTER — APPOINTMENT (OUTPATIENT)
Dept: PHYSICAL THERAPY | Age: 56
End: 2021-02-17
Attending: NEUROLOGICAL SURGERY
Payer: COMMERCIAL

## 2021-03-08 ENCOUNTER — TELEPHONE (OUTPATIENT)
Dept: NEUROLOGY | Facility: CLINIC | Age: 56
End: 2021-03-08

## 2021-03-08 NOTE — TELEPHONE ENCOUNTER
Cecilia Burton, patient dropped off insurance denial determination letter, dated 2/25/21 for   CPT CODE: 03709. ([id number]) The TFESI procedure with CPT CODE 00212 (see referral # [id number]) was scheduled for 12/04/20 with Dr. Luiza Manriquez.  Per nurse Maximino Claire the request

## 2021-03-08 NOTE — TELEPHONE ENCOUNTER
Received an email from Indra Morris advising patient to call business office at    Will call patient to inform. Spoke to patient informed the business office will be able to help with why his claim is being rejected.  Provided business office phone

## 2021-03-08 NOTE — TELEPHONE ENCOUNTER
Pt dropped off INS papers disputing the injection he received based on dates and codes. PT said he talked to someone in the office and was told to bring them in. They have been placed in nurses bin.

## 2021-06-10 ENCOUNTER — HOSPITAL ENCOUNTER (OUTPATIENT)
Age: 56
Discharge: HOME | End: 2021-06-10
Payer: COMMERCIAL

## 2021-06-10 VITALS
WEIGHT: 235 LBS | RESPIRATION RATE: 16 BRPM | HEART RATE: 68 BPM | TEMPERATURE: 97 F | OXYGEN SATURATION: 98 % | HEIGHT: 72 IN | DIASTOLIC BLOOD PRESSURE: 81 MMHG | BODY MASS INDEX: 31.83 KG/M2 | SYSTOLIC BLOOD PRESSURE: 140 MMHG

## 2021-06-10 DIAGNOSIS — R21 RASH IN ADULT: Primary | ICD-10-CM

## 2021-06-10 PROCEDURE — 99203 OFFICE O/P NEW LOW 30 MIN: CPT | Performed by: NURSE PRACTITIONER

## 2021-06-10 RX ORDER — METHYLPREDNISOLONE 4 MG/1
TABLET ORAL
Qty: 21 EACH | Refills: 0 | Status: SHIPPED | OUTPATIENT
Start: 2021-06-10

## 2021-06-10 NOTE — ED INITIAL ASSESSMENT (HPI)
Pt c/o rash to BLE since yesterday, nausea since this am. States feels like a sunburn. States started a new supplement yesterday. Denies being outside or coming in contact with anything. No vomiting. No LOC. No drainage.   Red, scattered rash noted to

## 2021-06-10 NOTE — ED PROVIDER NOTES
Patient Seen in: [facility]      History   Patient presents with:  Rash Skin Problem    Stated Complaint: nausea, rash    HPI/Subjective:   HPI  49-year-old male, with history of high cholesterol, presents to the immediate care with complain Capillary refill takes less than 2 seconds. Findings: Lesion and rash present. Comments:  There is a erythematous nonblanching vasculitis petechial rash noted to the lower extremities bilaterally to the medial aspect   Neurological:      General:

## 2022-02-03 ENCOUNTER — ORDER TRANSCRIPTION (OUTPATIENT)
Dept: SLEEP CENTER | Age: 57
End: 2022-02-03

## 2022-03-17 ENCOUNTER — OFFICE VISIT (OUTPATIENT)
Dept: SLEEP CENTER | Age: 57
End: 2022-03-17
Attending: INTERNAL MEDICINE
Payer: COMMERCIAL

## 2022-03-17 DIAGNOSIS — G47.10 HYPERSOMNIA: ICD-10-CM

## 2022-03-17 PROCEDURE — 95806 SLEEP STUDY UNATT&RESP EFFT: CPT

## 2022-03-23 NOTE — PROCEDURES
[facility]  Accredited by the [facility] ([facility])    PATIENT'S NAME: Otilia Salazar   ATTENDING PHYSICIAN: Nini Sewell MD   REFERRING PHYSICIAN: Nini Sewell MD   PATIENT ACCOUNT #: [de-identified] LOCATION: [address] #: [id number] YOB: 1965   DATE OF STUDY: 03/17/2022       SLEEP STUDY REPORT    STUDY TYPE:  Home sleep test.    INDICATION:  Suspected obstructive sleep apnea (ICD-10 code G47.33), in a patient with hypersomnia, an [place] Sleepiness Scale score of 9/24, and a body mass index of 31.9. RESULTS:  The patient underwent home sleep test with measurement of his nasal airflow, nasal air pressure, snoring, chest and abdominal wall motion, oximetry, and body position. I have reviewed the entirety of the raw data of this study. During this study, total recording time is 476 minutes. The lights-out clock time is 9:03 p.m.; the lights-on clock time is 4:59 a.m. There were 21 apneic events, of which 18 were obstructive and 3 were central, for an apnea index of 2.6 events per hour. There were 191 hypopneic events for a hypopnea index of 24.1 events per hour. The combined apnea plus hypopnea index is 26.7 events per hour. There was no significant hypoventilation, Cheyne-Tyson breathing, or periodic breathing. The average oxygen saturation is 95%, the lowest oxygen saturation is 78%, and the average desaturation is 5%. The oxygen desaturation index is 27.7 events per hour. Snoring was appreciated. The patient spent 472 minutes in the supine position, equivalent to 99% of the testing, and 3 minutes in the non-supine position, equivalent to 1% of the testing. The average heart rate is 54 beats per minute. INTERPRETATION:  The data generated from this study is consistent with moderate obstructive sleep apnea (ICD-10 code G47.33). RECOMMENDATIONS:  1. CPAP titration. 2.   Weight loss. 3.   Avoid alcohol.   4. Avoid sedating drug. 5.   Patient should not drive if at all sleepy. Please do not hesitate to contact me if there is any question whatsoever regarding interpretation of this study.     Dictated By Rogenia Bumpers, MD  d: 03/22/2022 23:07:59  t: 03/22/2022 23:29:46  Job [id number]  UVW/    cc: Dr. Toni Mock

## 2022-05-24 ENCOUNTER — ORDER TRANSCRIPTION (OUTPATIENT)
Dept: SLEEP CENTER | Age: 57
End: 2022-05-24

## 2022-05-24 DIAGNOSIS — G47.33 OBSTRUCTIVE SLEEP APNEA (ADULT) (PEDIATRIC): Primary | ICD-10-CM

## 2022-06-30 ENCOUNTER — HOSPITAL ENCOUNTER (OUTPATIENT)
Age: 57
Discharge: HOME | End: 2022-06-30
Payer: COMMERCIAL

## 2022-06-30 VITALS
BODY MASS INDEX: 31.83 KG/M2 | WEIGHT: 235 LBS | TEMPERATURE: 97 F | RESPIRATION RATE: 16 BRPM | SYSTOLIC BLOOD PRESSURE: 133 MMHG | DIASTOLIC BLOOD PRESSURE: 79 MMHG | HEIGHT: 72 IN | OXYGEN SATURATION: 99 % | HEART RATE: 59 BPM

## 2022-06-30 DIAGNOSIS — R21 RASH/SKIN ERUPTION: Primary | ICD-10-CM

## 2022-06-30 PROCEDURE — 99213 OFFICE O/P EST LOW 20 MIN: CPT | Performed by: NURSE PRACTITIONER

## 2022-06-30 RX ORDER — METHYLPREDNISOLONE 4 MG/1
TABLET ORAL
Qty: 1 EACH | Refills: 0 | Status: SHIPPED | OUTPATIENT
Start: 2022-06-30

## 2022-07-08 ENCOUNTER — LAB ENCOUNTER (OUTPATIENT)
Dept: LAB | Facility: HOSPITAL | Age: 57
End: 2022-07-08
Attending: INTERNAL MEDICINE
Payer: COMMERCIAL

## 2022-07-08 DIAGNOSIS — G47.33 OBSTRUCTIVE SLEEP APNEA (ADULT) (PEDIATRIC): ICD-10-CM

## 2022-07-09 LAB — SARS-COV-2 RNA RESP QL NAA+PROBE: NOT DETECTED

## 2022-07-11 ENCOUNTER — OFFICE VISIT (OUTPATIENT)
Dept: SLEEP CENTER | Age: 57
End: 2022-07-11
Attending: INTERNAL MEDICINE
Payer: COMMERCIAL

## 2022-07-11 DIAGNOSIS — G47.33 OBSTRUCTIVE SLEEP APNEA (ADULT) (PEDIATRIC): ICD-10-CM

## 2022-07-11 PROCEDURE — 95811 POLYSOM 6/>YRS CPAP 4/> PARM: CPT

## 2022-09-24 ENCOUNTER — HOSPITAL ENCOUNTER (OUTPATIENT)
Age: 57
Discharge: HOME | End: 2022-09-24

## 2022-09-24 VITALS
OXYGEN SATURATION: 100 % | RESPIRATION RATE: 18 BRPM | HEART RATE: 88 BPM | SYSTOLIC BLOOD PRESSURE: 133 MMHG | TEMPERATURE: 100 F | DIASTOLIC BLOOD PRESSURE: 76 MMHG

## 2022-09-24 DIAGNOSIS — Z20.822 ENCOUNTER FOR LABORATORY TESTING FOR COVID-19 VIRUS: Primary | ICD-10-CM

## 2022-09-24 DIAGNOSIS — U07.1 COVID: ICD-10-CM

## 2022-09-24 LAB
S PYO AG THROAT QL: NEGATIVE
SARS-COV-2 RNA RESP QL NAA+PROBE: DETECTED

## 2022-09-24 RX ORDER — CLARITHROMYCIN 250 MG/1
250 TABLET, FILM COATED ORAL 2 TIMES DAILY
Qty: 20 TABLET | Refills: 0 | Status: SHIPPED | OUTPATIENT
Start: 2022-09-24 | End: 2022-09-24

## 2022-09-24 RX ORDER — NIRMATRELVIR AND RITONAVIR 300-100 MG
KIT ORAL
Qty: 30 TABLET | Refills: 0 | Status: SHIPPED | OUTPATIENT
Start: 2022-09-24

## 2022-12-06 PROBLEM — G47.33 OBSTRUCTIVE SLEEP APNEA: Status: ACTIVE | Noted: 2022-12-06

## 2022-12-06 PROBLEM — E78.00 HYPERCHOLESTEROLEMIA: Status: ACTIVE | Noted: 2022-12-06

## 2022-12-09 ENCOUNTER — OFFICE VISIT (OUTPATIENT)
Dept: INTERNAL MEDICINE CLINIC | Facility: CLINIC | Age: 57
End: 2022-12-09
Payer: COMMERCIAL

## 2022-12-09 ENCOUNTER — LAB ENCOUNTER (OUTPATIENT)
Dept: LAB | Age: 57
End: 2022-12-09
Attending: INTERNAL MEDICINE
Payer: COMMERCIAL

## 2022-12-09 VITALS
HEIGHT: 72 IN | WEIGHT: 254.81 LBS | DIASTOLIC BLOOD PRESSURE: 76 MMHG | SYSTOLIC BLOOD PRESSURE: 130 MMHG | HEART RATE: 55 BPM | BODY MASS INDEX: 34.51 KG/M2

## 2022-12-09 DIAGNOSIS — Z00.00 ANNUAL PHYSICAL EXAM: Primary | ICD-10-CM

## 2022-12-09 DIAGNOSIS — Z00.00 ANNUAL PHYSICAL EXAM: ICD-10-CM

## 2022-12-09 LAB
ALBUMIN SERPL-MCNC: 3.8 G/DL (ref 3.4–5)
ALBUMIN/GLOB SERPL: 1.2 {RATIO} (ref 1–2)
ALP LIVER SERPL-CCNC: 75 U/L
ALT SERPL-CCNC: 62 U/L
ANION GAP SERPL CALC-SCNC: 6 MMOL/L (ref 0–18)
AST SERPL-CCNC: 28 U/L (ref 15–37)
BILIRUB SERPL-MCNC: 0.6 MG/DL (ref 0.1–2)
BUN BLD-MCNC: 12 MG/DL (ref 7–18)
BUN/CREAT SERPL: 9.2 (ref 10–20)
CALCIUM BLD-MCNC: 9 MG/DL (ref 8.5–10.1)
CHLORIDE SERPL-SCNC: 107 MMOL/L (ref 98–112)
CHOLEST SERPL-MCNC: 200 MG/DL (ref ?–200)
CO2 SERPL-SCNC: 27 MMOL/L (ref 21–32)
COMPLEXED PSA SERPL-MCNC: 0.45 NG/ML (ref ?–4)
CREAT BLD-MCNC: 1.31 MG/DL
DEPRECATED RDW RBC AUTO: 42.8 FL (ref 35.1–46.3)
ERYTHROCYTE [DISTWIDTH] IN BLOOD BY AUTOMATED COUNT: 12.6 % (ref 11–15)
FASTING PATIENT LIPID ANSWER: YES
FASTING STATUS PATIENT QL REPORTED: YES
GFR SERPLBLD BASED ON 1.73 SQ M-ARVRAT: 63 ML/MIN/1.73M2 (ref 60–?)
GLOBULIN PLAS-MCNC: 3.2 G/DL (ref 2.8–4.4)
GLUCOSE BLD-MCNC: 99 MG/DL (ref 70–99)
HCT VFR BLD AUTO: 41.2 %
HDLC SERPL-MCNC: 50 MG/DL (ref 40–59)
HGB BLD-MCNC: 14.8 G/DL
LDLC SERPL CALC-MCNC: 126 MG/DL (ref ?–100)
MCH RBC QN AUTO: 33.2 PG (ref 26–34)
MCHC RBC AUTO-ENTMCNC: 35.9 G/DL (ref 31–37)
MCV RBC AUTO: 92.4 FL
NONHDLC SERPL-MCNC: 150 MG/DL (ref ?–130)
OSMOLALITY SERPL CALC.SUM OF ELEC: 290 MOSM/KG (ref 275–295)
PLATELET # BLD AUTO: 211 10(3)UL (ref 150–450)
POTASSIUM SERPL-SCNC: 3.6 MMOL/L (ref 3.5–5.1)
PROT SERPL-MCNC: 7 G/DL (ref 6.4–8.2)
RBC # BLD AUTO: 4.46 X10(6)UL
SODIUM SERPL-SCNC: 140 MMOL/L (ref 136–145)
TRIGL SERPL-MCNC: 132 MG/DL (ref 30–149)
VLDLC SERPL CALC-MCNC: 24 MG/DL (ref 0–30)
WBC # BLD AUTO: 5.8 X10(3) UL (ref 4–11)

## 2022-12-09 PROCEDURE — 80053 COMPREHEN METABOLIC PANEL: CPT

## 2022-12-09 PROCEDURE — 3078F DIAST BP <80 MM HG: CPT | Performed by: INTERNAL MEDICINE

## 2022-12-09 PROCEDURE — 80061 LIPID PANEL: CPT

## 2022-12-09 PROCEDURE — 3008F BODY MASS INDEX DOCD: CPT | Performed by: INTERNAL MEDICINE

## 2022-12-09 PROCEDURE — 99386 PREV VISIT NEW AGE 40-64: CPT | Performed by: INTERNAL MEDICINE

## 2022-12-09 PROCEDURE — 85027 COMPLETE CBC AUTOMATED: CPT

## 2022-12-09 PROCEDURE — 36415 COLL VENOUS BLD VENIPUNCTURE: CPT

## 2022-12-09 PROCEDURE — 3075F SYST BP GE 130 - 139MM HG: CPT | Performed by: INTERNAL MEDICINE

## 2022-12-09 NOTE — PATIENT INSTRUCTIONS
Await results of today's blood tests. Please contact GI to arrange repeat colonoscopy. Please try to follow a healthy diet, exercise regularly and lose weight.   Annual physical.

## 2023-05-02 ENCOUNTER — LAB ENCOUNTER (OUTPATIENT)
Dept: LAB | Age: 58
End: 2023-05-02
Attending: INTERNAL MEDICINE
Payer: COMMERCIAL

## 2023-05-02 ENCOUNTER — OFFICE VISIT (OUTPATIENT)
Dept: INTERNAL MEDICINE CLINIC | Facility: CLINIC | Age: 58
End: 2023-05-02

## 2023-05-02 VITALS
SYSTOLIC BLOOD PRESSURE: 144 MMHG | WEIGHT: 259.19 LBS | HEART RATE: 65 BPM | HEIGHT: 72 IN | BODY MASS INDEX: 35.11 KG/M2 | DIASTOLIC BLOOD PRESSURE: 84 MMHG

## 2023-05-02 DIAGNOSIS — R53.83 OTHER FATIGUE: Primary | ICD-10-CM

## 2023-05-02 DIAGNOSIS — R53.83 OTHER FATIGUE: ICD-10-CM

## 2023-05-02 LAB
ALBUMIN SERPL-MCNC: 3.8 G/DL (ref 3.4–5)
ALBUMIN/GLOB SERPL: 1.1 {RATIO} (ref 1–2)
ALP LIVER SERPL-CCNC: 76 U/L
ALT SERPL-CCNC: 63 U/L
ANION GAP SERPL CALC-SCNC: 6 MMOL/L (ref 0–18)
AST SERPL-CCNC: 34 U/L (ref 15–37)
BILIRUB SERPL-MCNC: 0.6 MG/DL (ref 0.1–2)
BUN BLD-MCNC: 13 MG/DL (ref 7–18)
BUN/CREAT SERPL: 10.5 (ref 10–20)
CALCIUM BLD-MCNC: 9 MG/DL (ref 8.5–10.1)
CHLORIDE SERPL-SCNC: 107 MMOL/L (ref 98–112)
CO2 SERPL-SCNC: 27 MMOL/L (ref 21–32)
CREAT BLD-MCNC: 1.24 MG/DL
DEPRECATED RDW RBC AUTO: 41.3 FL (ref 35.1–46.3)
ERYTHROCYTE [DISTWIDTH] IN BLOOD BY AUTOMATED COUNT: 12.3 % (ref 11–15)
FASTING STATUS PATIENT QL REPORTED: NO
GFR SERPLBLD BASED ON 1.73 SQ M-ARVRAT: 67 ML/MIN/1.73M2 (ref 60–?)
GLOBULIN PLAS-MCNC: 3.5 G/DL (ref 2.8–4.4)
GLUCOSE BLD-MCNC: 89 MG/DL (ref 70–99)
HCT VFR BLD AUTO: 43.5 %
HGB BLD-MCNC: 15.6 G/DL
MCH RBC QN AUTO: 33 PG (ref 26–34)
MCHC RBC AUTO-ENTMCNC: 35.9 G/DL (ref 31–37)
MCV RBC AUTO: 92 FL
OSMOLALITY SERPL CALC.SUM OF ELEC: 290 MOSM/KG (ref 275–295)
PLATELET # BLD AUTO: 228 10(3)UL (ref 150–450)
POTASSIUM SERPL-SCNC: 3.9 MMOL/L (ref 3.5–5.1)
PROT SERPL-MCNC: 7.3 G/DL (ref 6.4–8.2)
RBC # BLD AUTO: 4.73 X10(6)UL
SODIUM SERPL-SCNC: 140 MMOL/L (ref 136–145)
TSI SER-ACNC: 1.91 MIU/ML (ref 0.36–3.74)
WBC # BLD AUTO: 7.9 X10(3) UL (ref 4–11)

## 2023-05-02 PROCEDURE — 80053 COMPREHEN METABOLIC PANEL: CPT

## 2023-05-02 PROCEDURE — 3079F DIAST BP 80-89 MM HG: CPT | Performed by: INTERNAL MEDICINE

## 2023-05-02 PROCEDURE — 84443 ASSAY THYROID STIM HORMONE: CPT

## 2023-05-02 PROCEDURE — 3008F BODY MASS INDEX DOCD: CPT | Performed by: INTERNAL MEDICINE

## 2023-05-02 PROCEDURE — 36415 COLL VENOUS BLD VENIPUNCTURE: CPT

## 2023-05-02 PROCEDURE — 3077F SYST BP >= 140 MM HG: CPT | Performed by: INTERNAL MEDICINE

## 2023-05-02 PROCEDURE — 85027 COMPLETE CBC AUTOMATED: CPT

## 2023-05-02 PROCEDURE — 99213 OFFICE O/P EST LOW 20 MIN: CPT | Performed by: INTERNAL MEDICINE

## 2023-05-02 NOTE — PATIENT INSTRUCTIONS
Await results of today's blood tests. I believe your increased tiredness is related to longer work hours and less sleep. Ideally working more regular work hours and getting better sleep would help.

## 2023-09-14 ENCOUNTER — NURSE ONLY (OUTPATIENT)
Facility: CLINIC | Age: 58
End: 2023-09-14

## 2023-09-14 DIAGNOSIS — Z12.11 COLON CANCER SCREENING: Primary | ICD-10-CM

## 2023-10-25 ENCOUNTER — TELEPHONE (OUTPATIENT)
Dept: INTERNAL MEDICINE CLINIC | Facility: CLINIC | Age: 58
End: 2023-10-25

## 2023-12-04 ENCOUNTER — TELEPHONE (OUTPATIENT)
Facility: CLINIC | Age: 58
End: 2023-12-04

## 2023-12-04 NOTE — TELEPHONE ENCOUNTER
Patient contacted, name/ verified. Prep instructions regarding dietary recommendations reviewed with patient in detail. All questions answered to his satisfaction. Patient verbalized understanding, no further concerns, issues at this time.

## 2023-12-04 NOTE — TELEPHONE ENCOUNTER
Pt calling to discuss instructions for 12/8 CLN. He wants to know what he can or should not eat prior to CLN.   Please call

## 2023-12-08 PROCEDURE — 88305 TISSUE EXAM BY PATHOLOGIST: CPT | Performed by: INTERNAL MEDICINE

## 2023-12-11 ENCOUNTER — MED REC SCAN ONLY (OUTPATIENT)
Facility: CLINIC | Age: 58
End: 2023-12-11

## 2023-12-12 ENCOUNTER — TELEPHONE (OUTPATIENT)
Facility: CLINIC | Age: 58
End: 2023-12-12

## 2023-12-12 NOTE — TELEPHONE ENCOUNTER
Recall colonoscopy in 10 years per Dr. Emerald Carbajal done 12/8/2023    Health maintenance updated and message sent to patient outreach to repeat colonoscopy in 10 years    Results reviewed with patient over the phone by Dr. Major Lee

## 2023-12-12 NOTE — TELEPHONE ENCOUNTER
----- Message from Michael Hancock MD sent at 12/11/2023  6:34 PM CST -----  I spoke to the patient. He is feeling well. The ascending colon polyp was hyperplastic. The transverse colon biopsies were negative, however, visually the nodule visually appeared to be a lipoma. Uncomplicated diverticulosis was present. I recommended a high-fiber diet for diverticulosis and a screening colonoscopy in 10 years unless any new symptoms or signs are noted. GI RNs: Please enter colonoscopy recall for 10 years.

## 2023-12-15 ENCOUNTER — OFFICE VISIT (OUTPATIENT)
Dept: INTERNAL MEDICINE CLINIC | Facility: CLINIC | Age: 58
End: 2023-12-15
Payer: COMMERCIAL

## 2023-12-15 ENCOUNTER — LAB ENCOUNTER (OUTPATIENT)
Dept: LAB | Age: 58
End: 2023-12-15
Attending: INTERNAL MEDICINE
Payer: COMMERCIAL

## 2023-12-15 VITALS
HEART RATE: 53 BPM | BODY MASS INDEX: 36.65 KG/M2 | HEIGHT: 70 IN | SYSTOLIC BLOOD PRESSURE: 134 MMHG | WEIGHT: 256 LBS | DIASTOLIC BLOOD PRESSURE: 82 MMHG

## 2023-12-15 DIAGNOSIS — Z00.00 ANNUAL PHYSICAL EXAM: Primary | ICD-10-CM

## 2023-12-15 DIAGNOSIS — E78.00 HYPERCHOLESTEROLEMIA: ICD-10-CM

## 2023-12-15 DIAGNOSIS — G47.33 OBSTRUCTIVE SLEEP APNEA SYNDROME: ICD-10-CM

## 2023-12-15 DIAGNOSIS — Z00.00 ANNUAL PHYSICAL EXAM: ICD-10-CM

## 2023-12-15 LAB
ALBUMIN SERPL-MCNC: 4.2 G/DL (ref 3.2–4.8)
ALBUMIN/GLOB SERPL: 1.4 {RATIO} (ref 1–2)
ALP LIVER SERPL-CCNC: 74 U/L
ALT SERPL-CCNC: 30 U/L
ANION GAP SERPL CALC-SCNC: 5 MMOL/L (ref 0–18)
AST SERPL-CCNC: 22 U/L (ref ?–34)
BILIRUB SERPL-MCNC: 0.6 MG/DL (ref 0.2–1.1)
BUN BLD-MCNC: 12 MG/DL (ref 9–23)
BUN/CREAT SERPL: 9.5 (ref 10–20)
CALCIUM BLD-MCNC: 9.3 MG/DL (ref 8.7–10.4)
CHLORIDE SERPL-SCNC: 108 MMOL/L (ref 98–112)
CHOLEST SERPL-MCNC: 207 MG/DL (ref ?–200)
CO2 SERPL-SCNC: 26 MMOL/L (ref 21–32)
COMPLEXED PSA SERPL-MCNC: 0.46 NG/ML (ref ?–4)
CREAT BLD-MCNC: 1.26 MG/DL
DEPRECATED RDW RBC AUTO: 40.3 FL (ref 35.1–46.3)
EGFRCR SERPLBLD CKD-EPI 2021: 65 ML/MIN/1.73M2 (ref 60–?)
ERYTHROCYTE [DISTWIDTH] IN BLOOD BY AUTOMATED COUNT: 12.3 % (ref 11–15)
FASTING PATIENT LIPID ANSWER: YES
FASTING STATUS PATIENT QL REPORTED: YES
GLOBULIN PLAS-MCNC: 3 G/DL (ref 2.8–4.4)
GLUCOSE BLD-MCNC: 102 MG/DL (ref 70–99)
HCT VFR BLD AUTO: 43 %
HDLC SERPL-MCNC: 47 MG/DL (ref 40–59)
HGB BLD-MCNC: 15.5 G/DL
LDLC SERPL CALC-MCNC: 138 MG/DL (ref ?–100)
MCH RBC QN AUTO: 32.6 PG (ref 26–34)
MCHC RBC AUTO-ENTMCNC: 36 G/DL (ref 31–37)
MCV RBC AUTO: 90.5 FL
NONHDLC SERPL-MCNC: 160 MG/DL (ref ?–130)
OSMOLALITY SERPL CALC.SUM OF ELEC: 288 MOSM/KG (ref 275–295)
PLATELET # BLD AUTO: 216 10(3)UL (ref 150–450)
POTASSIUM SERPL-SCNC: 4.1 MMOL/L (ref 3.5–5.1)
PROT SERPL-MCNC: 7.2 G/DL (ref 5.7–8.2)
RBC # BLD AUTO: 4.75 X10(6)UL
SODIUM SERPL-SCNC: 139 MMOL/L (ref 136–145)
TRIGL SERPL-MCNC: 124 MG/DL (ref 30–149)
VLDLC SERPL CALC-MCNC: 23 MG/DL (ref 0–30)
WBC # BLD AUTO: 7.4 X10(3) UL (ref 4–11)

## 2023-12-15 PROCEDURE — 3008F BODY MASS INDEX DOCD: CPT | Performed by: INTERNAL MEDICINE

## 2023-12-15 PROCEDURE — 99396 PREV VISIT EST AGE 40-64: CPT | Performed by: INTERNAL MEDICINE

## 2023-12-15 PROCEDURE — 80061 LIPID PANEL: CPT

## 2023-12-15 PROCEDURE — 80053 COMPREHEN METABOLIC PANEL: CPT

## 2023-12-15 PROCEDURE — 36415 COLL VENOUS BLD VENIPUNCTURE: CPT

## 2023-12-15 PROCEDURE — 85027 COMPLETE CBC AUTOMATED: CPT

## 2023-12-15 PROCEDURE — 3079F DIAST BP 80-89 MM HG: CPT | Performed by: INTERNAL MEDICINE

## 2023-12-15 PROCEDURE — 3075F SYST BP GE 130 - 139MM HG: CPT | Performed by: INTERNAL MEDICINE

## 2023-12-15 NOTE — PATIENT INSTRUCTIONS
Your blood pressure today was slightly borderline at 134/82 and your physical was normal.  Await results of blood tests. Continue healthy diet and regular exercise with hopefully some weight loss.   Annual physical.

## 2024-07-29 NOTE — Clinical Note
Dear Jone Panda,    I had the opportunity to see your patient Montgomery Snellen recently. I am sending you this update, and I appreciate your confidence in me to care for your patients.  Please feel free call me with any questions at [phone] or contact me through No

## 2025-08-26 ENCOUNTER — TELEPHONE (OUTPATIENT)
Dept: INTERNAL MEDICINE CLINIC | Facility: CLINIC | Age: 60
End: 2025-08-26

## 2025-08-27 ENCOUNTER — OFFICE VISIT (OUTPATIENT)
Dept: INTERNAL MEDICINE CLINIC | Facility: CLINIC | Age: 60
End: 2025-08-27

## 2025-08-27 VITALS
HEART RATE: 65 BPM | SYSTOLIC BLOOD PRESSURE: 155 MMHG | DIASTOLIC BLOOD PRESSURE: 88 MMHG | OXYGEN SATURATION: 96 % | HEIGHT: 70 IN | WEIGHT: 269.38 LBS | BODY MASS INDEX: 38.57 KG/M2

## 2025-08-27 DIAGNOSIS — Z00.00 PHYSICAL EXAM, ANNUAL: Primary | ICD-10-CM

## 2025-08-27 DIAGNOSIS — Z12.5 ENCOUNTER FOR SCREENING FOR MALIGNANT NEOPLASM OF PROSTATE: ICD-10-CM

## 2025-08-27 DIAGNOSIS — I10 ESSENTIAL HYPERTENSION: ICD-10-CM

## 2025-08-29 ENCOUNTER — LAB ENCOUNTER (OUTPATIENT)
Dept: LAB | Facility: HOSPITAL | Age: 60
End: 2025-08-29
Attending: INTERNAL MEDICINE

## 2025-08-29 DIAGNOSIS — Z00.00 PHYSICAL EXAM, ANNUAL: ICD-10-CM

## 2025-08-29 DIAGNOSIS — I10 ESSENTIAL HYPERTENSION: ICD-10-CM

## 2025-08-29 DIAGNOSIS — Z12.5 ENCOUNTER FOR SCREENING FOR MALIGNANT NEOPLASM OF PROSTATE: ICD-10-CM

## 2025-08-29 LAB
ALBUMIN SERPL-MCNC: 4.5 G/DL (ref 3.2–4.8)
ALBUMIN/GLOB SERPL: 1.7 (ref 1–2)
ALP LIVER SERPL-CCNC: 85 U/L (ref 45–117)
ALT SERPL-CCNC: 46 U/L (ref 10–49)
ANION GAP SERPL CALC-SCNC: 6 MMOL/L (ref 0–18)
AST SERPL-CCNC: 29 U/L (ref ?–34)
BILIRUB SERPL-MCNC: 0.7 MG/DL (ref 0.2–1.1)
BUN BLD-MCNC: 13 MG/DL (ref 9–23)
BUN/CREAT SERPL: 9.7 (ref 10–20)
CALCIUM BLD-MCNC: 9.5 MG/DL (ref 8.7–10.4)
CHLORIDE SERPL-SCNC: 107 MMOL/L (ref 98–112)
CHOLEST SERPL-MCNC: 232 MG/DL (ref ?–200)
CO2 SERPL-SCNC: 26 MMOL/L (ref 21–32)
COMPLEXED PSA SERPL-MCNC: 0.44 NG/ML (ref ?–4)
CREAT BLD-MCNC: 1.34 MG/DL (ref 0.7–1.3)
DEPRECATED RDW RBC AUTO: 42.9 FL (ref 35.1–46.3)
EGFRCR SERPLBLD CKD-EPI 2021: 60 ML/MIN/1.73M2 (ref 60–?)
ERYTHROCYTE [DISTWIDTH] IN BLOOD BY AUTOMATED COUNT: 12.5 % (ref 11–15)
FASTING PATIENT LIPID ANSWER: NO
FASTING STATUS PATIENT QL REPORTED: NO
GLOBULIN PLAS-MCNC: 2.7 G/DL (ref 2–3.5)
GLUCOSE BLD-MCNC: 159 MG/DL (ref 70–99)
HCT VFR BLD AUTO: 42.5 % (ref 39–53)
HDLC SERPL-MCNC: 47 MG/DL (ref 40–59)
HGB BLD-MCNC: 15 G/DL (ref 13–17.5)
LDLC SERPL CALC-MCNC: 152 MG/DL (ref ?–100)
MCH RBC QN AUTO: 32.8 PG (ref 26–34)
MCHC RBC AUTO-ENTMCNC: 35.3 G/DL (ref 31–37)
MCV RBC AUTO: 93 FL (ref 80–100)
NONHDLC SERPL-MCNC: 185 MG/DL (ref ?–130)
OSMOLALITY SERPL CALC.SUM OF ELEC: 291 MOSM/KG (ref 275–295)
PLATELET # BLD AUTO: 204 10(3)UL (ref 150–450)
POTASSIUM SERPL-SCNC: 3.5 MMOL/L (ref 3.5–5.1)
PROT SERPL-MCNC: 7.2 G/DL (ref 5.7–8.2)
RBC # BLD AUTO: 4.57 X10(6)UL (ref 4.3–5.7)
SODIUM SERPL-SCNC: 139 MMOL/L (ref 136–145)
TRIGL SERPL-MCNC: 180 MG/DL (ref 30–149)
TSI SER-ACNC: 2 UIU/ML (ref 0.55–4.78)
VLDLC SERPL CALC-MCNC: 35 MG/DL (ref 0–30)
WBC # BLD AUTO: 7.1 X10(3) UL (ref 4–11)

## 2025-08-29 PROCEDURE — 80061 LIPID PANEL: CPT

## 2025-08-29 PROCEDURE — 84443 ASSAY THYROID STIM HORMONE: CPT

## 2025-08-29 PROCEDURE — 80053 COMPREHEN METABOLIC PANEL: CPT

## 2025-08-29 PROCEDURE — 85027 COMPLETE CBC AUTOMATED: CPT

## 2025-08-29 PROCEDURE — 36415 COLL VENOUS BLD VENIPUNCTURE: CPT

## (undated) DIAGNOSIS — G47.10 HYPERSOMNIA: Primary | ICD-10-CM

## (undated) NOTE — LETTER
[facility] SURGERY SCHEDULING FORM   [address] [facility], [place]   [phone] (scheduling phone) [phone] (scheduling fax)     PATIENT INFORMATION   Last Name:      Agnes Tsang      First Name:    Alena Closs Allergies: Levaquin [Levofloxacin], Penicillins, and Quinolones         Completed by:    Ana Laura Solders      Date:    11/30/2020

## (undated) NOTE — LETTER
[address], [place]  Authorization for Invasive Procedures  1.  I hereby authorize Dr. Chente Ruiz , my physician and whomever may be designated as the doctor's assistant, to perform the following operation and/or procedure:  Left L% tr 4. Should the need arise during my operation or immediate post-operative period; I also consent to the administration of blood and/or blood products.  Further, I understand that despite careful testing and screening of blood and blood products, I may still 9. Patients having a sterilization procedure: I understand that if the procedure is successful the results will be permanent and it will therefore be impossible for me to inseminate, conceive or bear children.  I also understand that the procedure is intend

## (undated) NOTE — LETTER
Date & Time: 6/10/2021, 8:34 AM  Patient: Dashawn Bostefano  Encounter Provider(s):    JUAN C Sewell       To Whom It May Concern:    Rosalba Krishna was seen and treated in our department on 6/10/2021.  He should not return to work until 6/1

## (undated) NOTE — LETTER
Date & Time: 10/26/2018, 12:06 PM  Patient: Barry Lynn  Encounter Provider(s):    Aston Ortiz MD       To Whom It May Concern:    Barry Lynn was seen and treated in our department on 10/26/2018. He should not return to work until 10/30/18.     I

## (undated) NOTE — Clinical Note
Dear Alissa Ponce,    Thank you for sending Rosalba Krishna to see me for physiatry consultation. I appreciate your confidence in me to care for your patients. Please feel free call me with any questions at [phone] or contact me through [facility]     Sincerely,  [place]

## (undated) NOTE — LETTER
[address]   Date:   12/15/2020     Name:   Montgomery Snellen    YOB: 1965   MRN:   [id number]       WHERE IS YOUR PAIN NOW? Jeremias the areas on your body where you feel the described sensations.   Use the appropriate sym

## (undated) NOTE — LETTER
[address] [address]   Date:   11/30/2020     Name:   Flaco Pal    YOB: 1965   MRN:   [id number]       WHERE IS YOUR PAIN NOW? Jeremias the areas on your body where you feel the described sensations.   U

## (undated) NOTE — LETTER
[address]   Date:   11/17/2020     Name:   Willy Sheikh    YOB: 1965   MRN:   [id number]       WHERE IS YOUR PAIN NOW? Jeremias the areas on your body where you feel the described sensations.   Use the appropriate sym

## (undated) NOTE — LETTER
Date & Time: 9/24/2022, 12:09 PM  Patient: Coleman Schirmer  Encounter Provider(s):    JUAN C Samuels       To Whom It May Concern:    Kristin Jimenez was seen and treated in our department on 9/24/2022. He can return to work with these limitations: 9/28/22 - must wear a medical grade mask the remainder of the week.     If you have any questions or concerns, please do not hesitate to call.        _____________________________  Physician/[facility] Signature